# Patient Record
Sex: FEMALE | Race: ASIAN | NOT HISPANIC OR LATINO | URBAN - METROPOLITAN AREA
[De-identification: names, ages, dates, MRNs, and addresses within clinical notes are randomized per-mention and may not be internally consistent; named-entity substitution may affect disease eponyms.]

---

## 2022-01-20 ENCOUNTER — OFFICE VISIT (OUTPATIENT)
Dept: URBAN - METROPOLITAN AREA CLINIC 78 | Facility: CLINIC | Age: 22
End: 2022-01-20
Payer: COMMERCIAL

## 2022-01-20 VITALS
HEIGHT: 61 IN | OXYGEN SATURATION: 99 % | SYSTOLIC BLOOD PRESSURE: 104 MMHG | DIASTOLIC BLOOD PRESSURE: 68 MMHG | WEIGHT: 122.6 LBS | HEART RATE: 75 BPM | TEMPERATURE: 97.3 F | BODY MASS INDEX: 23.15 KG/M2

## 2022-01-20 DIAGNOSIS — K62.5 RECTAL BLEEDING: ICD-10-CM

## 2022-01-20 DIAGNOSIS — R10.9 ABDOMINAL CRAMPS: ICD-10-CM

## 2022-01-20 DIAGNOSIS — R55 NEAR SYNCOPE: ICD-10-CM

## 2022-01-20 PROCEDURE — 99204 OFFICE O/P NEW MOD 45 MIN: CPT | Performed by: INTERNAL MEDICINE

## 2022-01-20 RX ORDER — HYOSCYAMINE SULFATE 0.12 MG/1
1 TABLET UNDER THE TONGUE AND ALLOW TO DISSOLVE TABLET, ORALLY DISINTEGRATING ORAL
Qty: 40 | Refills: 2 | OUTPATIENT
Start: 2022-01-20 | End: 2022-10-17

## 2022-01-20 RX ORDER — FERROUS SULFATE 325(65) MG
1 TABLET TABLET ORAL ONCE A DAY
Status: ACTIVE | COMMUNITY

## 2022-01-20 RX ORDER — SODIUM PICOSULFATE, MAGNESIUM OXIDE, AND ANHYDROUS CITRIC ACID 10; 3.5; 12 MG/160ML; G/160ML; G/160ML
160ML AS DIRECTED LIQUID ORAL ONCE
Qty: 1 | Refills: 0 | OUTPATIENT
Start: 2022-01-20 | End: 2022-01-21

## 2022-01-20 NOTE — HPI-TODAY'S VISIT:
The patient presents on referral of the [facility] ED for rectal bleeding.  She has noted intermittnet eisodes of rectal bleeding and lower,abdominal pain since Nov 2021. She describes the pain as cramping with 3-5 episodes of BRB daily. She tends to have 1-2 BM's daily. She has been able to eat and drink and reports her appetite is good.  No vomiting.  Pt having the urge to defecate numerous times daily.   She only takes NSAIDS once monthly for menstrual pain.    She had a near syncopal episode. No fevers or chills.  Noo weight loss. Denies nausea.     She has not been exposed to any recent antibiotics. She denies any travel.   No FH of IBD. Her grandmother had stomach ulcers. Her mother follows with .  She has never had a colonoscopy. She goes to [employer] and is under a lot of stress. Her mother states she is a brilliant student.   Summary of prior workup:  - Labs 1/15/22: sodium 137, potassium 3.4, glucose 92, BUN 16, creatinine 0.7, total protein 7.4, albumin 4.3, total bilirubin 0.2, ALT 20, AST 15, alkaline phosphatase 86. Lipase 71. Calcium 9.1, pregnancy test negative. White blood cell count 7.2, hemoglobin 11.9, MCV 84. Platelets 354. - Normal pelvic ultrasound on 12/21/21.

## 2022-01-31 ENCOUNTER — TELEPHONE ENCOUNTER (OUTPATIENT)
Dept: URBAN - METROPOLITAN AREA CLINIC 78 | Facility: CLINIC | Age: 22
End: 2022-01-31

## 2022-02-02 ENCOUNTER — CLAIMS CREATED FROM THE CLAIM WINDOW (OUTPATIENT)
Dept: URBAN - METROPOLITAN AREA CLINIC 4 | Facility: CLINIC | Age: 22
End: 2022-02-02
Payer: COMMERCIAL

## 2022-02-02 ENCOUNTER — OFFICE VISIT (OUTPATIENT)
Dept: URBAN - METROPOLITAN AREA SURGERY CENTER 15 | Facility: SURGERY CENTER | Age: 22
End: 2022-02-02
Payer: COMMERCIAL

## 2022-02-02 ENCOUNTER — TELEPHONE ENCOUNTER (OUTPATIENT)
Dept: URBAN - METROPOLITAN AREA CLINIC 78 | Facility: CLINIC | Age: 22
End: 2022-02-02

## 2022-02-02 DIAGNOSIS — K50.00 CROHN'S DISEASE OF SMALL INTESTINE WITHOUT COMPLICATIONS: ICD-10-CM

## 2022-02-02 DIAGNOSIS — K92.1 ACUTE MELENA: ICD-10-CM

## 2022-02-02 DIAGNOSIS — R10.84 ABDOMINAL CRAMPING, GENERALIZED: ICD-10-CM

## 2022-02-02 DIAGNOSIS — K50.80 CROHN'S COLITIS: ICD-10-CM

## 2022-02-02 DIAGNOSIS — K50.119 CROHN'S DISEASE OF LARGE INTESTINE WITH UNSPECIFIED COMPLICATIONS: ICD-10-CM

## 2022-02-02 DIAGNOSIS — R19.7 ACUTE DIARRHEA: ICD-10-CM

## 2022-02-02 PROCEDURE — G8907 PT DOC NO EVENTS ON DISCHARG: HCPCS | Performed by: INTERNAL MEDICINE

## 2022-02-02 PROCEDURE — 88342 IMHCHEM/IMCYTCHM 1ST ANTB: CPT | Performed by: PATHOLOGY

## 2022-02-02 PROCEDURE — 88305 TISSUE EXAM BY PATHOLOGIST: CPT | Performed by: PATHOLOGY

## 2022-02-02 PROCEDURE — 45380 COLONOSCOPY AND BIOPSY: CPT | Performed by: INTERNAL MEDICINE

## 2022-02-02 RX ORDER — HYOSCYAMINE SULFATE 0.12 MG/1
1 TABLET UNDER THE TONGUE AND ALLOW TO DISSOLVE TABLET, ORALLY DISINTEGRATING ORAL
Qty: 40 | Refills: 2 | Status: ACTIVE | COMMUNITY
Start: 2022-01-20 | End: 2022-10-17

## 2022-02-02 RX ORDER — MESALAMINE 4 G/60ML
AS DIRECTED SUSPENSION RECTAL QHS
Qty: 21 | Refills: 1 | OUTPATIENT
Start: 2022-02-02 | End: 2022-03-16

## 2022-02-02 RX ORDER — MESALAMINE 1.2 G/1
4 TABLETS TABLET, DELAYED RELEASE ORAL ONCE A DAY
Qty: 360 TABLET | Refills: 2 | OUTPATIENT
Start: 2022-02-02 | End: 2022-10-30

## 2022-02-02 RX ORDER — FERROUS SULFATE 325(65) MG
1 TABLET TABLET ORAL ONCE A DAY
Status: ACTIVE | COMMUNITY

## 2022-02-03 ENCOUNTER — TELEPHONE ENCOUNTER (OUTPATIENT)
Dept: URBAN - METROPOLITAN AREA CLINIC 78 | Facility: CLINIC | Age: 22
End: 2022-02-03

## 2022-02-09 ENCOUNTER — TELEPHONE ENCOUNTER (OUTPATIENT)
Dept: URBAN - METROPOLITAN AREA CLINIC 78 | Facility: CLINIC | Age: 22
End: 2022-02-09

## 2022-03-07 ENCOUNTER — TELEPHONE ENCOUNTER (OUTPATIENT)
Dept: URBAN - METROPOLITAN AREA CLINIC 78 | Facility: CLINIC | Age: 22
End: 2022-03-07

## 2022-03-07 RX ORDER — MESALAMINE 1.2 G/1
4 TABLETS WITH A MEAL TABLET, DELAYED RELEASE ORAL ONCE A DAY
Qty: 360 TABLET | Refills: 0 | OUTPATIENT
Start: 2022-03-07 | End: 2022-06-05

## 2022-03-29 ENCOUNTER — OFFICE VISIT (OUTPATIENT)
Dept: URBAN - METROPOLITAN AREA CLINIC 78 | Facility: CLINIC | Age: 22
End: 2022-03-29
Payer: COMMERCIAL

## 2022-03-29 VITALS
BODY MASS INDEX: 22.31 KG/M2 | HEART RATE: 91 BPM | HEIGHT: 61 IN | DIASTOLIC BLOOD PRESSURE: 69 MMHG | SYSTOLIC BLOOD PRESSURE: 101 MMHG | TEMPERATURE: 97.8 F | WEIGHT: 118.2 LBS

## 2022-03-29 DIAGNOSIS — R35.0 URINARY FREQUENCY: ICD-10-CM

## 2022-03-29 DIAGNOSIS — K62.5 RECTAL BLEEDING: ICD-10-CM

## 2022-03-29 DIAGNOSIS — R10.9 ABDOMINAL CRAMPS: ICD-10-CM

## 2022-03-29 DIAGNOSIS — K50.90 CROHN DISEASE: ICD-10-CM

## 2022-03-29 PROCEDURE — 99215 OFFICE O/P EST HI 40 MIN: CPT | Performed by: INTERNAL MEDICINE

## 2022-03-29 RX ORDER — MESALAMINE 1.2 G/1
4 TABLETS TABLET, DELAYED RELEASE ORAL ONCE A DAY
Qty: 360 TABLET | Refills: 2

## 2022-03-29 RX ORDER — FERROUS SULFATE 325(65) MG
1 TABLET TABLET ORAL ONCE A DAY
Status: ACTIVE | COMMUNITY

## 2022-03-29 RX ORDER — HYOSCYAMINE SULFATE 0.12 MG/1
1 TABLET UNDER THE TONGUE AND ALLOW TO DISSOLVE TABLET, ORALLY DISINTEGRATING ORAL
Qty: 40 | Refills: 2 | Status: ACTIVE | COMMUNITY
Start: 2022-01-20 | End: 2022-10-17

## 2022-03-29 RX ORDER — MESALAMINE 1.2 G/1
4 TABLETS WITH A MEAL TABLET, DELAYED RELEASE ORAL ONCE A DAY
Qty: 360 TABLET | Refills: 0 | Status: ACTIVE | COMMUNITY
Start: 2022-03-07 | End: 2022-06-05

## 2022-03-29 RX ORDER — MESALAMINE 1.2 G/1
4 TABLETS TABLET, DELAYED RELEASE ORAL ONCE A DAY
Qty: 360 TABLET | Refills: 2 | Status: ACTIVE | COMMUNITY
Start: 2022-02-02 | End: 2022-10-30

## 2022-05-30 ENCOUNTER — TELEPHONE ENCOUNTER (OUTPATIENT)
Dept: URBAN - METROPOLITAN AREA CLINIC 78 | Facility: CLINIC | Age: 22
End: 2022-05-30

## 2022-05-30 RX ORDER — MESALAMINE 1.2 G/1
4 TABLETS TABLET, DELAYED RELEASE ORAL ONCE A DAY
Qty: 360 TABLET | Refills: 2
End: 2023-02-24

## 2022-10-01 LAB
A/G RATIO: 1.6
ALBUMIN: 4.6
ALKALINE PHOSPHATASE: 144
ALT (SGPT): 10
APPEARANCE: CLEAR
AST (SGOT): 16
BACTERIA: (no result)
BILIRUBIN, TOTAL: 0.2
BILIRUBIN: NEGATIVE
BUN/CREATININE RATIO: 16
BUN: 12
C-REACTIVE PROTEIN, QUANT: 1
CALCIUM: 9.9
CARBON DIOXIDE, TOTAL: 21
CAST TYPE: (no result)
CASTS: (no result)
CHLORIDE: 102
COMMENT: (no result)
CREATININE: 0.74
CRYSTAL TYPE: (no result)
CRYSTALS: (no result)
EGFR: 117
EPITHELIAL CELLS (NON RENAL): (no result)
EPITHELIAL CELLS (RENAL): (no result)
FERRITIN, SERUM: 6
GLOBULIN, TOTAL: 2.8
GLUCOSE: 91
GLUCOSE: NEGATIVE
HEMATOCRIT: 33.8
HEMOGLOBIN: 10.4
KETONES: NEGATIVE
MCH: 23.9
MCHC: 30.8
MCV: 78
MICROSCOPIC EXAMINATION: (no result)
MICROSCOPIC EXAMINATION: (no result)
MUCUS THREADS: (no result)
NITRITE, URINE: NEGATIVE
NRBC: (no result)
OCCULT BLOOD: (no result)
PH: 6
PLATELETS: 427
POTASSIUM: 4.3
PROTEIN, TOTAL: 7.4
PROTEIN: NEGATIVE
RBC: (no result)
RBC: 4.35
RDW: 16.2
SEDIMENTATION RATE-WESTERGREN: 36
SODIUM: 138
SPECIFIC GRAVITY: <=1.005
TRICHOMONAS: (no result)
URINALYSIS REFLEX: (no result)
URINE-COLOR: YELLOW
UROBILINOGEN,SEMI-QN: 0.2
VITAMIN B12: 337
WBC ESTERASE: NEGATIVE
WBC: (no result)
WBC: 6.8
YEAST: (no result)

## 2022-10-18 ENCOUNTER — OFFICE VISIT (OUTPATIENT)
Dept: URBAN - METROPOLITAN AREA CLINIC 78 | Facility: CLINIC | Age: 22
End: 2022-10-18
Payer: COMMERCIAL

## 2022-10-18 VITALS
HEIGHT: 61 IN | HEART RATE: 96 BPM | TEMPERATURE: 98.1 F | BODY MASS INDEX: 24.2 KG/M2 | WEIGHT: 128.2 LBS | DIASTOLIC BLOOD PRESSURE: 70 MMHG | SYSTOLIC BLOOD PRESSURE: 107 MMHG

## 2022-10-18 DIAGNOSIS — R10.9 ABDOMINAL CRAMPS: ICD-10-CM

## 2022-10-18 DIAGNOSIS — K62.5 RECTAL BLEEDING: ICD-10-CM

## 2022-10-18 DIAGNOSIS — K50.90 CROHN DISEASE: ICD-10-CM

## 2022-10-18 DIAGNOSIS — D50.9 IRON DEFICIENCY ANEMIA, UNSPECIFIED IRON DEFICIENCY ANEMIA TYPE: ICD-10-CM

## 2022-10-18 DIAGNOSIS — R35.0 URINARY FREQUENCY: ICD-10-CM

## 2022-10-18 PROCEDURE — 99214 OFFICE O/P EST MOD 30 MIN: CPT | Performed by: INTERNAL MEDICINE

## 2022-10-18 RX ORDER — HYOSCYAMINE SULFATE 0.12 MG/1
1 TABLET UNDER THE TONGUE AND ALLOW TO DISSOLVE TABLET, ORALLY DISINTEGRATING ORAL
Qty: 40 | Refills: 2 | OUTPATIENT
Start: 2022-10-18 | End: 2023-07-15

## 2022-10-18 RX ORDER — FERROUS SULFATE 325(65) MG
1 TABLET TABLET ORAL ONCE A DAY
Status: ACTIVE | COMMUNITY

## 2022-10-18 RX ORDER — MESALAMINE 1.2 G/1
4 TABLETS TABLET, DELAYED RELEASE ORAL ONCE A DAY
Qty: 360 TABLET | Refills: 2 | Status: ACTIVE | COMMUNITY
End: 2023-02-24

## 2022-10-18 RX ORDER — SODIUM SULFATE, MAGNESIUM SULFATE, AND POTASSIUM CHLORIDE 17.75; 2.7; 2.25 G/1; G/1; G/1
12 TABLETS TABLET ORAL
Qty: 24 TABLETS | Refills: 0 | OUTPATIENT
Start: 2022-10-18 | End: 2022-10-19

## 2022-10-18 RX ORDER — ONDANSETRON HYDROCHLORIDE 4 MG/1
1 TABLET TABLET, FILM COATED ORAL
Qty: 7 | Refills: 0 | OUTPATIENT
Start: 2022-10-18

## 2022-10-18 NOTE — HPI-TODAY'S VISIT:
I initially met the patient for rectal bleeding, described as intermittnet eisodes along with lower,abdominal pain since Nov 2021. She describes the pain as cramping with 3-5 episodes of BRB daily. She tends to have 1-2 BM's daily. She has been able to eat and drink and reports her appetite is good.  No vomiting.  Pt having the urge to defecate numerous times daily.   She only takes NSAIDS once monthly for menstrual pain.    I performed a colonoscopy on 2/2/22 which revealed diffuse active ileitis and colitis.  Biopsies confirmed this with the pathologist favoring CD more so than UC.  I started her on mesalamine in the interim. She states she is feeling better. As a matter of fact she is training for a 5k. She is tolerating the Mesalamine well without issues.  She has been having 2-3 loose BM's daily, but she has a lot of urgency. She has less cramping than before, but these are quite intense right around the time where she is to get her periods. She will see BRB very rarely. She has nausea often, but does not vomit.  She has gained 10 lbs since her last viist.    No fevers or chills.     No FH of IBD. Her grandmother had stomach ulcers.    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  She goes to [employer]. She is a brilliant student.   Today we reviewed the results of her recentl labs.  Summary of prior workup: - Labs on 3/29/22: ESR 36, CRP 1, glucose 91, Ferritin 6, B12 337, BUn 12, Cr 0.74, Na 138, K 4.3, Ca 9.9, Alb 4.6, TB 0.2, , AST 16, ALT 10. UA 1+ occult blood. Hb 10.4, MCV 78, WBC 6.8, Plts 427.   - Colonoscopy by me 2/2/2022 revealed diffuse active ileitis and colitis.  Biopsies confirmed this with the pathologist favoring CD more so than UC.  No precancerous changes.  No CMV evident.    - Labs 1/15/22: sodium 137, potassium 3.4, glucose 92, BUN 16, creatinine 0.7, total protein 7.4, albumin 4.3, total bilirubin 0.2, ALT 20, AST 15, alkaline phosphatase 86. Lipase 71. Calcium 9.1, pregnancy test negative. White blood cell count 7.2, hemoglobin 11.9, MCV 84. Platelets 354. - Normal pelvic ultrasound on 12/21/21.

## 2022-10-19 ENCOUNTER — TELEPHONE ENCOUNTER (OUTPATIENT)
Dept: URBAN - METROPOLITAN AREA CLINIC 78 | Facility: CLINIC | Age: 22
End: 2022-10-19

## 2022-11-28 ENCOUNTER — TELEPHONE ENCOUNTER (OUTPATIENT)
Dept: URBAN - METROPOLITAN AREA CLINIC 78 | Facility: CLINIC | Age: 22
End: 2022-11-28

## 2022-11-30 ENCOUNTER — CLAIMS CREATED FROM THE CLAIM WINDOW (OUTPATIENT)
Dept: URBAN - METROPOLITAN AREA SURGERY CENTER 15 | Facility: SURGERY CENTER | Age: 22
End: 2022-11-30

## 2022-11-30 ENCOUNTER — CLAIMS CREATED FROM THE CLAIM WINDOW (OUTPATIENT)
Dept: URBAN - METROPOLITAN AREA SURGERY CENTER 15 | Facility: SURGERY CENTER | Age: 22
End: 2022-11-30
Payer: COMMERCIAL

## 2022-11-30 ENCOUNTER — CLAIMS CREATED FROM THE CLAIM WINDOW (OUTPATIENT)
Dept: URBAN - METROPOLITAN AREA CLINIC 4 | Facility: CLINIC | Age: 22
End: 2022-11-30
Payer: COMMERCIAL

## 2022-11-30 DIAGNOSIS — K63.89 OTHER SPECIFIED DISEASES OF INTESTINE: ICD-10-CM

## 2022-11-30 DIAGNOSIS — K63.89 BACTERIAL OVERGROWTH SYNDROME: ICD-10-CM

## 2022-11-30 PROCEDURE — 45380 COLONOSCOPY AND BIOPSY: CPT | Performed by: INTERNAL MEDICINE

## 2022-11-30 PROCEDURE — G8907 PT DOC NO EVENTS ON DISCHARG: HCPCS | Performed by: INTERNAL MEDICINE

## 2022-11-30 PROCEDURE — 88305 TISSUE EXAM BY PATHOLOGIST: CPT | Performed by: PATHOLOGY

## 2022-11-30 RX ORDER — HYOSCYAMINE SULFATE 0.12 MG/1
1 TABLET UNDER THE TONGUE AND ALLOW TO DISSOLVE TABLET, ORALLY DISINTEGRATING ORAL
Qty: 40 | Refills: 2 | Status: ACTIVE | COMMUNITY
Start: 2022-10-18 | End: 2023-07-15

## 2022-11-30 RX ORDER — ONDANSETRON HYDROCHLORIDE 4 MG/1
1 TABLET TABLET, FILM COATED ORAL
Qty: 7 | Refills: 0 | Status: ACTIVE | COMMUNITY
Start: 2022-10-18

## 2022-11-30 RX ORDER — MESALAMINE 1.2 G/1
4 TABLETS TABLET, DELAYED RELEASE ORAL ONCE A DAY
Qty: 360 TABLET | Refills: 2 | Status: ACTIVE | COMMUNITY
End: 2023-02-24

## 2022-11-30 RX ORDER — FERROUS SULFATE 325(65) MG
1 TABLET TABLET ORAL ONCE A DAY
Status: ACTIVE | COMMUNITY

## 2022-12-13 ENCOUNTER — TELEPHONE ENCOUNTER (OUTPATIENT)
Dept: URBAN - METROPOLITAN AREA CLINIC 78 | Facility: CLINIC | Age: 22
End: 2022-12-13

## 2022-12-23 ENCOUNTER — OFFICE VISIT (OUTPATIENT)
Dept: URBAN - METROPOLITAN AREA SURGERY CENTER 15 | Facility: SURGERY CENTER | Age: 22
End: 2022-12-23

## 2023-02-13 ENCOUNTER — OFFICE VISIT (OUTPATIENT)
Dept: URBAN - METROPOLITAN AREA CLINIC 78 | Facility: CLINIC | Age: 23
End: 2023-02-13
Payer: COMMERCIAL

## 2023-02-13 VITALS
TEMPERATURE: 97.9 F | HEIGHT: 61 IN | BODY MASS INDEX: 24.55 KG/M2 | WEIGHT: 130 LBS | DIASTOLIC BLOOD PRESSURE: 71 MMHG | HEART RATE: 69 BPM | SYSTOLIC BLOOD PRESSURE: 106 MMHG

## 2023-02-13 DIAGNOSIS — K50.90 CROHN DISEASE: ICD-10-CM

## 2023-02-13 DIAGNOSIS — D50.9 IRON DEFICIENCY ANEMIA, UNSPECIFIED IRON DEFICIENCY ANEMIA TYPE: ICD-10-CM

## 2023-02-13 DIAGNOSIS — R10.9 ABDOMINAL CRAMPS: ICD-10-CM

## 2023-02-13 DIAGNOSIS — R14.0 BLOATING: ICD-10-CM

## 2023-02-13 DIAGNOSIS — K62.5 RECTAL BLEEDING: ICD-10-CM

## 2023-02-13 DIAGNOSIS — R35.0 URINARY FREQUENCY: ICD-10-CM

## 2023-02-13 PROBLEM — 87522002 IRON DEFICIENCY ANEMIA: Status: ACTIVE | Noted: 2022-10-18

## 2023-02-13 PROBLEM — 12063002: Status: ACTIVE | Noted: 2022-01-24

## 2023-02-13 PROBLEM — 34000006 CROHN DISEASE: Status: ACTIVE | Noted: 2022-03-29

## 2023-02-13 PROCEDURE — 99214 OFFICE O/P EST MOD 30 MIN: CPT | Performed by: INTERNAL MEDICINE

## 2023-02-13 RX ORDER — FERROUS SULFATE 325(65) MG
1 TABLET TABLET ORAL ONCE A DAY
Status: ACTIVE | COMMUNITY

## 2023-02-13 RX ORDER — HYOSCYAMINE SULFATE 0.12 MG/1
1 TABLET UNDER THE TONGUE AND ALLOW TO DISSOLVE TABLET, ORALLY DISINTEGRATING ORAL
Qty: 40 | Refills: 2 | Status: ACTIVE | COMMUNITY
Start: 2022-10-18 | End: 2023-07-15

## 2023-02-13 RX ORDER — ONDANSETRON HYDROCHLORIDE 4 MG/1
1 TABLET TABLET, FILM COATED ORAL
Qty: 7 | Refills: 0 | Status: ACTIVE | COMMUNITY
Start: 2022-10-18

## 2023-02-13 RX ORDER — MESALAMINE 1.2 G/1
4 TABLETS TABLET, DELAYED RELEASE ORAL ONCE A DAY
Qty: 360 TABLET | Refills: 2 | Status: ACTIVE | COMMUNITY
End: 2023-02-24

## 2023-02-13 NOTE — HPI-TODAY'S VISIT:
I initially met the patient for rectal bleeding, described as intermittent episodes along with lower,abdominal pain since Nov 2021. The pain was crampy, alongside 3-5 episodes of BRB daily. She tends to have 1-2 BM's daily. Pt having the urge to defecate numerous times daily.   She only takes NSAIDS once monthly for menstrual pain.    I performed a colonoscopy on 2/2/22 which revealed diffuse active ileitis and colitis.  Biopsies confirmed this with the pathologist favoring CD more so than UC.  I started her on Mesalamine in the interim. She states she is feeling better. As a matter of fact she is training for a 5k. She is tolerating the Mesalamine well without issues. She is not having pica or severe fatigue anymore.  She has been having 2-3 formed BM's daily without any of the fecal urgency. She has no cramping (has not had to use the Bentyl I had previously prescribed) and has not had any furhter rectal bleeding.  The nausea has also resovled. No vomiting.   Appetite is great. She is eating well but has noted increased bloating on some days.   No FH of IBD. Her grandmother had stomach ulcers.    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 She will be graduating from [employer] in may and already has a work offer to start in Sept. She will be taking the summer off. She is a brilliant student.   Today we reviewed the results of her recent colonoscopy/path.  Summary of prior workup: - Colonoscopy by me on 11/30/23: Normal TI. Normal appearing R colon (biopsies negative for colitis/dysplasia). There was mild desc and sigm colitis, however biopsies were also unremarkable. - Labs on 3/29/22: ESR 36, CRP 1, glucose 91, Ferritin 6, B12 337, BUn 12, Cr 0.74, Na 138, K 4.3, Ca 9.9, Alb 4.6, TB 0.2, , AST 16, ALT 10. UA 1+ occult blood. Hb 10.4, MCV 78, WBC 6.8, Plts 427.   - Colonoscopy by me 2/2/2022 revealed diffuse active ileitis and colitis.  Biopsies confirmed this with the pathologist favoring CD more so than UC.  No precancerous changes.  No CMV evident.    - Labs 1/15/22: sodium 137, potassium 3.4, glucose 92, BUN 16, creatinine 0.7, total protein 7.4, albumin 4.3, total bilirubin 0.2, ALT 20, AST 15, alkaline phosphatase 86. Lipase 71. Calcium 9.1, pregnancy test negative. White blood cell count 7.2, hemoglobin 11.9, MCV 84. Platelets 354. - Normal pelvic ultrasound on 12/21/21.

## 2023-02-14 LAB
A/G RATIO: 1.7
ALBUMIN: 4.5
ALKALINE PHOSPHATASE: 146
ALT (SGPT): 9
AST (SGOT): 14
BILIRUBIN, TOTAL: 0.4
BUN/CREATININE RATIO: 21
BUN: 15
C-REACTIVE PROTEIN, QUANT: 1
CALCIUM: 9.2
CARBON DIOXIDE, TOTAL: 23
CHLORIDE: 103
CREATININE: 0.7
EGFR: 125
FERRITIN, SERUM: 22
GLOBULIN, TOTAL: 2.6
GLUCOSE: 77
HEMATOCRIT: 43.3
HEMOGLOBIN: 14.3
IRON BIND.CAP.(TIBC): 290
IRON SATURATION: 50
IRON: 146
MCH: 29.7
MCHC: 33
MCV: 90
NRBC: (no result)
PLATELETS: 319
POTASSIUM: 4.4
PROTEIN, TOTAL: 7.1
RBC: 4.81
RDW: 13.4
SEDIMENTATION RATE-WESTERGREN: 11
SODIUM: 139
TSH: 2.05
UIBC: 144
VITAMIN B12: 449
VITAMIN D, 25-HYDROXY: 20
WBC: 5.6

## 2023-04-16 ENCOUNTER — ERX REFILL RESPONSE (OUTPATIENT)
Dept: URBAN - METROPOLITAN AREA CLINIC 78 | Facility: CLINIC | Age: 23
End: 2023-04-16

## 2023-04-16 RX ORDER — MESALAMINE 1.2 G/1
TAKE 4 TABLETS ONCE DAILY TABLET, DELAYED RELEASE ORAL
Qty: 360 TABLET | Refills: 3 | OUTPATIENT

## 2023-04-16 RX ORDER — MESALAMINE 1.2 G/1
TAKE 4 TABLETS ONCE DAILY TABLET, DELAYED RELEASE ORAL
Qty: 360 TABLET | Refills: 4 | OUTPATIENT

## 2023-05-27 ENCOUNTER — OUT OF OFFICE VISIT (OUTPATIENT)
Dept: URBAN - METROPOLITAN AREA MEDICAL CENTER 10 | Facility: MEDICAL CENTER | Age: 23
End: 2023-05-27
Payer: COMMERCIAL

## 2023-05-27 DIAGNOSIS — D72.829 LEUKOCYTOSIS: ICD-10-CM

## 2023-05-27 DIAGNOSIS — R10.33 ABDOMINAL PAIN, ACUTE, PERIUMBILICAL: ICD-10-CM

## 2023-05-27 DIAGNOSIS — R50.9 FEVER: ICD-10-CM

## 2023-05-27 DIAGNOSIS — K50.10 CC (CROHN'S COLITIS): ICD-10-CM

## 2023-05-27 PROCEDURE — 99253 IP/OBS CNSLTJ NEW/EST LOW 45: CPT | Performed by: INTERNAL MEDICINE

## 2023-05-27 PROCEDURE — 99231 SBSQ HOSP IP/OBS SF/LOW 25: CPT | Performed by: INTERNAL MEDICINE

## 2023-05-28 ENCOUNTER — OUT OF OFFICE VISIT (OUTPATIENT)
Dept: URBAN - METROPOLITAN AREA MEDICAL CENTER 10 | Facility: MEDICAL CENTER | Age: 23
End: 2023-05-28
Payer: COMMERCIAL

## 2023-05-28 ENCOUNTER — OUT OF OFFICE VISIT (OUTPATIENT)
Dept: URBAN - METROPOLITAN AREA MEDICAL CENTER 10 | Facility: MEDICAL CENTER | Age: 23
End: 2023-05-28

## 2023-05-28 DIAGNOSIS — R11.2 ACUTE NAUSEA WITH NONBILIOUS VOMITING: ICD-10-CM

## 2023-05-28 DIAGNOSIS — A04.72 C. DIFFICILE: ICD-10-CM

## 2023-05-28 DIAGNOSIS — R19.7 ACUTE DIARRHEA: ICD-10-CM

## 2023-05-28 DIAGNOSIS — K50.118 CROHN'S COLITIS, OTHER COMPLICATION: ICD-10-CM

## 2023-05-28 PROCEDURE — 99232 SBSQ HOSP IP/OBS MODERATE 35: CPT | Performed by: INTERNAL MEDICINE

## 2023-05-30 ENCOUNTER — OUT OF OFFICE VISIT (OUTPATIENT)
Dept: URBAN - METROPOLITAN AREA MEDICAL CENTER 10 | Facility: MEDICAL CENTER | Age: 23
End: 2023-05-30
Payer: COMMERCIAL

## 2023-05-30 DIAGNOSIS — K50.90 ABDOMINAL PAIN DESPITE THERAPY FOR CROHN'S DISEASE: ICD-10-CM

## 2023-05-30 DIAGNOSIS — A04.72 C. DIFFICILE: ICD-10-CM

## 2023-05-30 PROCEDURE — 99232 SBSQ HOSP IP/OBS MODERATE 35: CPT | Performed by: INTERNAL MEDICINE

## 2023-06-09 ENCOUNTER — TELEPHONE ENCOUNTER (OUTPATIENT)
Dept: URBAN - METROPOLITAN AREA CLINIC 78 | Facility: CLINIC | Age: 23
End: 2023-06-09

## 2023-06-14 ENCOUNTER — TELEPHONE ENCOUNTER (OUTPATIENT)
Dept: URBAN - METROPOLITAN AREA CLINIC 78 | Facility: CLINIC | Age: 23
End: 2023-06-14

## 2023-06-14 ENCOUNTER — OFFICE VISIT (OUTPATIENT)
Dept: URBAN - METROPOLITAN AREA CLINIC 78 | Facility: CLINIC | Age: 23
End: 2023-06-14
Payer: COMMERCIAL

## 2023-06-14 DIAGNOSIS — A04.72 C. DIFFICILE DIARRHEA: ICD-10-CM

## 2023-06-14 DIAGNOSIS — K50.90 CROHN DISEASE: ICD-10-CM

## 2023-06-14 DIAGNOSIS — K62.5 RECTAL BLEEDING: ICD-10-CM

## 2023-06-14 DIAGNOSIS — D50.9 IRON DEFICIENCY ANEMIA, UNSPECIFIED IRON DEFICIENCY ANEMIA TYPE: ICD-10-CM

## 2023-06-14 DIAGNOSIS — R14.0 BLOATING: ICD-10-CM

## 2023-06-14 DIAGNOSIS — R35.0 URINARY FREQUENCY: ICD-10-CM

## 2023-06-14 DIAGNOSIS — R10.9 ABDOMINAL CRAMPS: ICD-10-CM

## 2023-06-14 PROCEDURE — 99214 OFFICE O/P EST MOD 30 MIN: CPT | Performed by: INTERNAL MEDICINE

## 2023-06-14 RX ORDER — ONDANSETRON HYDROCHLORIDE 4 MG/1
1 TABLET TABLET, FILM COATED ORAL
Qty: 7 | Refills: 0 | Status: ACTIVE | COMMUNITY
Start: 2022-10-18

## 2023-06-14 RX ORDER — MESALAMINE 1.2 G/1
TAKE 4 TABLETS ONCE DAILY TABLET, DELAYED RELEASE ORAL
Qty: 360 TABLET | Refills: 3 | Status: ACTIVE | COMMUNITY

## 2023-06-14 RX ORDER — FERROUS SULFATE 325(65) MG
1 TABLET TABLET ORAL ONCE A DAY
Status: ACTIVE | COMMUNITY

## 2023-06-14 RX ORDER — ONDANSETRON HYDROCHLORIDE 4 MG/1
1 TABLET TABLET, FILM COATED ORAL
Qty: 7 | Refills: 0 | OUTPATIENT

## 2023-06-14 RX ORDER — HYOSCYAMINE SULFATE 0.12 MG/1
1 TABLET UNDER THE TONGUE AND ALLOW TO DISSOLVE TABLET, ORALLY DISINTEGRATING ORAL
Qty: 40 | Refills: 2 | OUTPATIENT

## 2023-06-14 RX ORDER — HYOSCYAMINE SULFATE 0.12 MG/1
1 TABLET UNDER THE TONGUE AND ALLOW TO DISSOLVE TABLET, ORALLY DISINTEGRATING ORAL
Qty: 40 | Refills: 2 | Status: ACTIVE | COMMUNITY
Start: 2022-10-18 | End: 2023-07-15

## 2023-06-14 NOTE — EXAM-PHYSICAL EXAM
VIRTUAL PHYSICAL EXAM  Comprehensive gastrointestinal virtual examination:  I asked the patient to be my hands.  I guided the patient to palpate and apply pressure at all four abdominal quadrants.  In response to these maneuvers, patient reported the following:  - Negative response to abdominal pain - Abdomen is soft, non-tender, and non-distended - No palpable abdominal masses - No palpable organs in the upper abdomen (no hepatomegaly or splenomegaly)  Constitutional - Ability to communicate:  Normal communication ability  Chest - Respiratory effort:  Breathing sounds unlabored  Cardiovascular - Palpation - Denies chest pain with palpation Neurologic - Orientation:  Oriented to person, place, and time  Psychiatric:  Normal mood

## 2023-06-14 NOTE — HPI-TODAY'S VISIT:
I initially met the patient for rectal bleeding, described as intermittent episodes along with lower,abdominal pain since Nov 2021. The pain was crampy, alongside 3-5 episodes of BRB daily. She tends to have 1-2 BM's daily. Pt having the urge to defecate numerous times daily.   She only takes NSAIDS once monthly for menstrual pain.    I performed a colonoscopy on 2/2/22 which revealed diffuse active ileitis and colitis.  Biopsies confirmed this with the pathologist favoring CD more so than UC.  I started her on Mesalamine 4 tablets QAM . At her last visit she was feeling great.  Unfortunately she had to take antibiotics in early March for a sinusitis.    She was hospitalized at [facility] in late May 23 after returning from a family trip to [place] to celebrate her graduation.  She developed nausea and bloody diarrhea on the flight back on 5/19, worsened over the ensuing days with abdominal pain. On admission she had a Tmax of 102.9.  Hemoglobin was at baseline, CT scan showed inflammation in the descending colon.  C. difficile PCR was positive, but C. difficile toxin negative.  Fecal lactoferrin was positive.  She was treated with oral vancomycin and IV Flagyl.  She lost 20lbs during this recent illness.  She is starting to get an appetite again. She is eating well but has noted increased bloating on some days.   Up until 5 days ago she had completed the Vanco, but was still throwing up. She is feeling better but still has some blood in the stools and cramping. however no further diarrhea. She is having ~3 BMs a day.   She has been having to take naps in the afternoon because she is very tired.   No FH of IBD. Her grandmother had stomach ulcers.    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 She already has a work offer to start in Sept. She will be taking the summer off. She is a brilliant student.   Summary of prior workup: - Labs on 5/31/2023: Sodium 138, potassium 3.8, BUN 26, creatinine 0.6, glucose 96, protein 6.7, total bilirubin 0.3, ALT 11, AST 15, alkaline phosphatase 72.  WBC is 10.2, hemoglobin 12.1, MCV 87, platelets 379. - Stool studies + for C diff in May 2023. - CT A/P in may 2023: Mild inflammatory changes of the descending colon, may represent Crohn's disease acute flare. No bowel obstruction, abscess, or fistula.  - Labs on 2/13/2023: ESR 11, iron 146, percent saturation 50, B12 449.  Ferritin 22.  CRP 1.  Vitamin D 20.  Glucose 77, BUN 15, creatinine 0.7, sodium 139, potassium 4.4, calcium 9.2, total protein 7.1, BUN 4.5, total bilirubin 0.4, alkaline phosphatase 146, AST 14, ALT 9.  WBC 5.6, hemoglobin 14.3, MCV 90, platelets 319.  TSH 2.050. - Colonoscopy by me on 11/30/23: Normal TI. Normal appearing R colon (biopsies negative for colitis/dysplasia). There was mild desc and sigm colitis, however biopsies were also unremarkable. - Labs on 3/29/22: ESR 36, CRP 1, glucose 91, Ferritin 6, B12 337, BUn 12, Cr 0.74, Na 138, K 4.3, Ca 9.9, Alb 4.6, TB 0.2, , AST 16, ALT 10. UA 1+ occult blood. Hb 10.4, MCV 78, WBC 6.8, Plts 427.   - Colonoscopy by me 2/2/2022 revealed diffuse active ileitis and colitis.  Biopsies confirmed this with the pathologist favoring CD more so than UC.  No precancerous changes.  No CMV evident.    - Labs 1/15/22: sodium 137, potassium 3.4, glucose 92, BUN 16, creatinine 0.7, total protein 7.4, albumin 4.3, total bilirubin 0.2, ALT 20, AST 15, alkaline phosphatase 86. Lipase 71. Calcium 9.1, pregnancy test negative. White blood cell count 7.2, hemoglobin 11.9, MCV 84. Platelets 354. - Normal pelvic ultrasound on 12/21/21.

## 2023-06-26 ENCOUNTER — LAB OUTSIDE AN ENCOUNTER (OUTPATIENT)
Dept: URBAN - METROPOLITAN AREA CLINIC 78 | Facility: CLINIC | Age: 23
End: 2023-06-26

## 2023-06-27 LAB
A/G RATIO: 1.4
ALBUMIN: 3
ALKALINE PHOSPHATASE: 115
ALT (SGPT): 11
AST (SGOT): 15
BILIRUBIN, TOTAL: <0.2
BUN/CREATININE RATIO: 12
BUN: 9
C-REACTIVE PROTEIN, QUANT: 3
CALCIUM: 8.4
CARBON DIOXIDE, TOTAL: 27
CHLORIDE: 105
CREATININE: 0.76
EGFR: 113
FERRITIN, SERUM: 16
GLOBULIN, TOTAL: 2.2
GLUCOSE: 95
HEMATOCRIT: 32.4
HEMOGLOBIN: 10.4
IRON BIND.CAP.(TIBC): 225
IRON SATURATION: 22
IRON: 50
MCH: 29.1
MCHC: 32.1
MCV: 91
NRBC: (no result)
PLATELETS: 485
POTASSIUM: 4.3
PROTEIN, TOTAL: 5.2
RBC: 3.58
RDW: 12.4
SEDIMENTATION RATE-WESTERGREN: 3
SODIUM: 140
TSH: 0.99
UIBC: 175
VITAMIN B12: 446
VITAMIN D, 25-HYDROXY: 18.4
WBC: 10.7

## 2023-06-30 ENCOUNTER — OFFICE VISIT (OUTPATIENT)
Dept: URBAN - METROPOLITAN AREA TELEHEALTH 2 | Facility: TELEHEALTH | Age: 23
End: 2023-06-30
Payer: COMMERCIAL

## 2023-06-30 DIAGNOSIS — K51.80 OTHER ULCERATIVE COLITIS: ICD-10-CM

## 2023-06-30 PROCEDURE — 97802 MEDICAL NUTRITION INDIV IN: CPT | Performed by: DIETITIAN, REGISTERED

## 2023-06-30 RX ORDER — MESALAMINE 1.2 G/1
TAKE 4 TABLETS ONCE DAILY TABLET, DELAYED RELEASE ORAL
Qty: 360 TABLET | Refills: 3 | Status: ACTIVE | COMMUNITY

## 2023-06-30 RX ORDER — ONDANSETRON HYDROCHLORIDE 4 MG/1
1 TABLET TABLET, FILM COATED ORAL
Qty: 7 | Refills: 0 | Status: ACTIVE | COMMUNITY

## 2023-06-30 RX ORDER — HYOSCYAMINE SULFATE 0.12 MG/1
1 TABLET UNDER THE TONGUE AND ALLOW TO DISSOLVE TABLET, ORALLY DISINTEGRATING ORAL
Qty: 40 | Refills: 2 | Status: ACTIVE | COMMUNITY

## 2023-06-30 RX ORDER — FERROUS SULFATE 325(65) MG
1 TABLET TABLET ORAL ONCE A DAY
Status: ACTIVE | COMMUNITY

## 2023-08-07 ENCOUNTER — OFFICE VISIT (OUTPATIENT)
Dept: URBAN - METROPOLITAN AREA CLINIC 78 | Facility: CLINIC | Age: 23
End: 2023-08-07

## 2023-11-02 ENCOUNTER — DASHBOARD ENCOUNTERS (OUTPATIENT)
Age: 23
End: 2023-11-02

## 2023-11-02 ENCOUNTER — OFFICE VISIT (OUTPATIENT)
Dept: URBAN - METROPOLITAN AREA CLINIC 78 | Facility: CLINIC | Age: 23
End: 2023-11-02
Payer: COMMERCIAL

## 2023-11-02 VITALS
DIASTOLIC BLOOD PRESSURE: 75 MMHG | BODY MASS INDEX: 24.24 KG/M2 | TEMPERATURE: 98.1 F | RESPIRATION RATE: 16 BRPM | SYSTOLIC BLOOD PRESSURE: 113 MMHG | WEIGHT: 128.4 LBS | HEIGHT: 61 IN | HEART RATE: 101 BPM

## 2023-11-02 DIAGNOSIS — K50.90 CROHN DISEASE: ICD-10-CM

## 2023-11-02 DIAGNOSIS — R14.0 BLOATING: ICD-10-CM

## 2023-11-02 DIAGNOSIS — R10.9 ABDOMINAL CRAMPS: ICD-10-CM

## 2023-11-02 DIAGNOSIS — A04.72 C. DIFFICILE DIARRHEA: ICD-10-CM

## 2023-11-02 DIAGNOSIS — E55.9 VITAMIN D DEFICIENCY: ICD-10-CM

## 2023-11-02 DIAGNOSIS — D50.9 IRON DEFICIENCY ANEMIA, UNSPECIFIED IRON DEFICIENCY ANEMIA TYPE: ICD-10-CM

## 2023-11-02 DIAGNOSIS — K62.5 RECTAL BLEEDING: ICD-10-CM

## 2023-11-02 DIAGNOSIS — R35.0 URINARY FREQUENCY: ICD-10-CM

## 2023-11-02 PROBLEM — 34713006: Status: ACTIVE | Noted: 2023-11-02

## 2023-11-02 PROCEDURE — 99214 OFFICE O/P EST MOD 30 MIN: CPT | Performed by: INTERNAL MEDICINE

## 2023-11-02 RX ORDER — FERROUS SULFATE 325(65) MG
1 TABLET TABLET ORAL ONCE A DAY
Status: ACTIVE | COMMUNITY

## 2023-11-02 RX ORDER — SODIUM PICOSULFATE, MAGNESIUM OXIDE, AND ANHYDROUS CITRIC ACID 12; 3.5; 1 G/175ML; G/175ML; MG/175ML
175 ML THE FIRST DOSE THE EVENING BEFORE AND SECOND DOSE THE MORNING OF COLONOSCOPY LIQUID ORAL ONCE A DAY
Qty: 350 | OUTPATIENT
Start: 2023-11-02 | End: 2023-11-04

## 2023-11-02 RX ORDER — MESALAMINE 1.2 G/1
TAKE 4 TABLETS ONCE DAILY TABLET, DELAYED RELEASE ORAL
Qty: 360 TABLET | Refills: 3 | Status: ACTIVE | COMMUNITY

## 2023-11-02 RX ORDER — MESALAMINE 1.2 G/1
3 TABLETS TABLET, DELAYED RELEASE ORAL ONCE A DAY
Qty: 360 TABLET | Refills: 3

## 2023-11-02 RX ORDER — HYOSCYAMINE SULFATE 0.12 MG/1
1 TABLET UNDER THE TONGUE AND ALLOW TO DISSOLVE TABLET, ORALLY DISINTEGRATING ORAL
Qty: 40 | Refills: 2 | Status: ACTIVE | COMMUNITY

## 2023-11-02 RX ORDER — ONDANSETRON HYDROCHLORIDE 4 MG/1
1 TABLET TABLET, FILM COATED ORAL
Qty: 7 | Refills: 0 | Status: ACTIVE | COMMUNITY

## 2023-11-02 NOTE — HPI-TODAY'S VISIT:
I initially met the patient for rectal bleeding, described as intermittent episodes along with lower,abdominal pain since Nov 2021. The pain was crampy, alongside 3-5 episodes of BRB daily. She tends to have 1-2 BM's daily. Pt having the urge to defecate numerous times daily.   She only takes NSAIDS once monthly for menstrual pain.    I performed a colonoscopy on 2/2/22 which revealed diffuse active ileitis and colitis.  Biopsies confirmed this with the pathologist favoring CD more so than UC.  I started her on Mesalamine 4 tablets QAM. She admits that she has not been fully compliant with taking these and was wondering whether she could stop.  Unfortunately she had to take antibiotics in early March for a sinusitis.    She was hospitalized at [facility] in late May 23 after returning from a family trip to [place] to celebrate her graduation.  She developed nausea and bloody diarrhea on the flight back on 5/19, worsened over the ensuing days with abdominal pain. On admission she had a Tmax of 102.9.  Hemoglobin was at baseline, CT scan showed inflammation in the descending colon.  C. difficile PCR was positive, but C. difficile toxin negative.  Fecal lactoferrin was positive.  She was treated with oral vancomycin and IV Flagyl.  She is feeling great now. She is not having any rectal bleeding, abdominal pain, unintentional weight loss or diarrhea. She is having 2 Bms daily. She has great appetite. She has continued noticing increased postprandial bloating.  She has noted increased heartburn only right around her periods.  She is not fatigued anymore.   No FH of IBD. Her grandmother had stomach ulcers.    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  She recently got engaged. Her fiance is also in consulting.    Summary of prior workup: - Labs 6/26/2023: ESR 3, iron 50, percent saturation 22, B12 446, ferritin 16.  CRP 3.  Vitamin D 18.  Glucose 95, BUN 9, creatinine 0.7, sodium 140, potassium 4.3, calcium 8.4, total protein 5.2, albumin 3.0, total bilirubin <0.2, alkaline phosphatase 118, AST 15, ALT 11.  WBC 10.75, hemoglobin 10.4, MCV 91, platelets 485. TSH 0.988 - Labs on 5/31/2023: Sodium 138, potassium 3.8, BUN 26, creatinine 0.6, glucose 96, protein 6.7, total bilirubin 0.3, ALT 11, AST 15, alkaline phosphatase 72.  WBC is 10.2, hemoglobin 12.1, MCV 87, platelets 379. - Stool studies + for C diff in May 2023. - CT A/P in may 2023: Mild inflammatory changes of the descending colon, may represent Crohn's disease acute flare. No bowel obstruction, abscess, or fistula.  - Labs on 2/13/2023: ESR 11, iron 146, percent saturation 50, B12 449.  Ferritin 22.  CRP 1.  Vitamin D 20.  Glucose 77, BUN 15, creatinine 0.7, sodium 139, potassium 4.4, calcium 9.2, total protein 7.1, BUN 4.5, total bilirubin 0.4, alkaline phosphatase 146, AST 14, ALT 9.  WBC 5.6, hemoglobin 14.3, MCV 90, platelets 319.  TSH 2.050. - Colonoscopy by me on 11/30/23: Normal TI. Normal appearing R colon (biopsies negative for colitis/dysplasia). There was mild desc and sigm colitis, however biopsies were also unremarkable. - Labs on 3/29/22: ESR 36, CRP 1, glucose 91, Ferritin 6, B12 337, BUn 12, Cr 0.74, Na 138, K 4.3, Ca 9.9, Alb 4.6, TB 0.2, , AST 16, ALT 10. UA 1+ occult blood. Hb 10.4, MCV 78, WBC 6.8, Plts 427.   - Colonoscopy by me 2/2/2022 revealed diffuse active ileitis and colitis.  Biopsies confirmed this with the pathologist favoring CD more so than UC.  No precancerous changes.  No CMV evident.    - Labs 1/15/22: sodium 137, potassium 3.4, glucose 92, BUN 16, creatinine 0.7, total protein 7.4, albumin 4.3, total bilirubin 0.2, ALT 20, AST 15, alkaline phosphatase 86. Lipase 71. Calcium 9.1, pregnancy test negative. White blood cell count 7.2, hemoglobin 11.9, MCV 84. Platelets 354. - Normal pelvic ultrasound on 12/21/21.

## 2024-01-05 ENCOUNTER — TELEPHONE ENCOUNTER (OUTPATIENT)
Dept: URBAN - METROPOLITAN AREA CLINIC 78 | Facility: CLINIC | Age: 24
End: 2024-01-05

## 2024-12-03 NOTE — HPI-TODAY'S VISIT:
I initially met the patient for rectal bleeding, described as intermittnet eisodes along with lower,abdominal pain since Nov 2021. She describes the pain as cramping with 3-5 episodes of BRB daily. She tends to have 1-2 BM's daily. She has been able to eat and drink and reports her appetite is good.  No vomiting.  Pt having the urge to defecate numerous times daily.   She only takes NSAIDS once monthly for menstrual pain.    I performed a colonoscopy on 2/2/22 which revealed diffuse active ileitis and colitis.  Biopsies confirmed this with the pathologist favoring CD more so than UC.  I started her on mesalamine in the interim.  She has a couple of BM's/daily. The stools are formed and the bleeding has resolved. The pain has gotten better.   No fevers or chills.  Denies nausea.    She lost about 4 lbs since her lst visit, but does admit that she has had improved appetite.   She was in [place].  No FH of IBD. Her grandmother had stomach ulcers. Her mother follows with .   She goes to [employer]. Her mother states she is a brilliant student.   Summary of prior workup: - Colonoscopy by me 2/2/2022 revealed diffuse active ileitis and colitis.  Biopsies confirmed this with the pathologist favoring CD more so than UC.  No precancerous changes.  No CMV evident.    - Labs 1/15/22: sodium 137, potassium 3.4, glucose 92, BUN 16, creatinine 0.7, total protein 7.4, albumin 4.3, total bilirubin 0.2, ALT 20, AST 15, alkaline phosphatase 86. Lipase 71. Calcium 9.1, pregnancy test negative. White blood cell count 7.2, hemoglobin 11.9, MCV 84. Platelets 354. - Normal pelvic ultrasound on 12/21/21.
You can access the FollowMyHealth Patient Portal offered by [facility] by registering at the following website: [email or web address]. By joining [employer]’s FollowMyHealth portal, you will also be able to view your health information using other applications (apps) compatible with our system.